# Patient Record
Sex: FEMALE | URBAN - METROPOLITAN AREA
[De-identification: names, ages, dates, MRNs, and addresses within clinical notes are randomized per-mention and may not be internally consistent; named-entity substitution may affect disease eponyms.]

---

## 2023-09-28 RX ORDER — LOSARTAN POTASSIUM AND HYDROCHLOROTHIAZIDE 12.5; 5 MG/1; MG/1
1 TABLET ORAL DAILY
COMMUNITY

## 2023-10-02 ENCOUNTER — ANESTHESIA EVENT (OUTPATIENT)
Dept: SURGERY | Facility: AMBULATORY SURGERY CENTER | Age: 58
End: 2023-10-02

## 2023-10-03 NOTE — PROGRESS NOTES
Preoperative Note     58yo with left ovarian cyst presenting for scheduled laparoscopic bilateral salpingoophorectomy    MRI: Uterus 3.6 x 4.6 x 6.1cm. ROV normal measuring 1.8 x 1.5 x 1.0cm. LOV measuring 6.8 x 5.6 x 5.3cm - well circumscribed, predominantly fat containing heterogenous, with abndlike areas of hypointensity and internal reticular ienhancement. Consistent with ovarian dermoid.     Ca125 negative    MRI demonstrates a complex left ovarian cyst morphologically consistent with a dermoid. Discussed with patient that dermoid cysts can be reliably diagnosed on imaging and are benign. However given her age, the size of the cyst, her pain, and the complexity would recommend surgical management. Reviewed surgical approach, namely laparoscopic oophorectomy. Given age, recommend pursuing BSO to avoid future pathology with the contralateral ovary. Discussed surgical risks including but not limited to pain, infection, bleeding, damage to surrounding structures including bladder/bowel/nerves; informed consent obtained. No significant changes to med/surg history, preoperative clearance obtained. Proceed to OR.     Ariadna Thomason MD

## 2023-10-04 ENCOUNTER — ANESTHESIA (OUTPATIENT)
Dept: SURGERY | Facility: AMBULATORY SURGERY CENTER | Age: 58
End: 2023-10-04

## 2023-10-04 ENCOUNTER — HOSPITAL ENCOUNTER (OUTPATIENT)
Facility: AMBULATORY SURGERY CENTER | Age: 58
Discharge: HOME | End: 2023-10-04
Attending: STUDENT IN AN ORGANIZED HEALTH CARE EDUCATION/TRAINING PROGRAM
Payer: COMMERCIAL

## 2023-10-04 VITALS
HEART RATE: 64 BPM | SYSTOLIC BLOOD PRESSURE: 121 MMHG | WEIGHT: 239 LBS | OXYGEN SATURATION: 95 % | TEMPERATURE: 97.5 F | HEIGHT: 69 IN | BODY MASS INDEX: 35.4 KG/M2 | DIASTOLIC BLOOD PRESSURE: 58 MMHG | RESPIRATION RATE: 16 BRPM

## 2023-10-04 DIAGNOSIS — N83.202 CYST OF LEFT OVARY: ICD-10-CM

## 2023-10-04 PROCEDURE — 88112 CYTOPATH CELL ENHANCE TECH: CPT | Mod: 26 | Performed by: PATHOLOGY

## 2023-10-04 RX ORDER — OXYCODONE HYDROCHLORIDE 5 MG/1
5 TABLET ORAL
Status: COMPLETED | OUTPATIENT
Start: 2023-10-04 | End: 2023-10-04

## 2023-10-04 RX ORDER — FENTANYL CITRATE 0.05 MG/ML
25 INJECTION, SOLUTION INTRAMUSCULAR; INTRAVENOUS EVERY 5 MIN PRN
Status: DISCONTINUED | OUTPATIENT
Start: 2023-10-04 | End: 2023-10-05 | Stop reason: HOSPADM

## 2023-10-04 RX ORDER — LIDOCAINE 40 MG/G
CREAM TOPICAL
Status: DISCONTINUED | OUTPATIENT
Start: 2023-10-04 | End: 2023-10-05 | Stop reason: HOSPADM

## 2023-10-04 RX ORDER — ACETAMINOPHEN 325 MG/1
975 TABLET ORAL EVERY 6 HOURS PRN
Qty: 50 TABLET | Refills: 0 | Status: SHIPPED | OUTPATIENT
Start: 2023-10-04

## 2023-10-04 RX ORDER — ONDANSETRON 2 MG/ML
INJECTION INTRAMUSCULAR; INTRAVENOUS PRN
Status: DISCONTINUED | OUTPATIENT
Start: 2023-10-04 | End: 2023-10-04

## 2023-10-04 RX ORDER — ONDANSETRON 4 MG/1
4 TABLET, ORALLY DISINTEGRATING ORAL EVERY 30 MIN PRN
Status: DISCONTINUED | OUTPATIENT
Start: 2023-10-04 | End: 2023-10-05 | Stop reason: HOSPADM

## 2023-10-04 RX ORDER — ONDANSETRON 2 MG/ML
4 INJECTION INTRAMUSCULAR; INTRAVENOUS EVERY 30 MIN PRN
Status: DISCONTINUED | OUTPATIENT
Start: 2023-10-04 | End: 2023-10-05 | Stop reason: HOSPADM

## 2023-10-04 RX ORDER — OXYCODONE HYDROCHLORIDE 5 MG/1
5 TABLET ORAL
Status: DISCONTINUED | OUTPATIENT
Start: 2023-10-04 | End: 2023-10-05 | Stop reason: HOSPADM

## 2023-10-04 RX ORDER — OXYCODONE HYDROCHLORIDE 5 MG/1
5-10 TABLET ORAL EVERY 4 HOURS PRN
Qty: 6 TABLET | Refills: 0 | Status: SHIPPED | OUTPATIENT
Start: 2023-10-04

## 2023-10-04 RX ORDER — LIDOCAINE HYDROCHLORIDE 20 MG/ML
INJECTION, SOLUTION INFILTRATION; PERINEURAL PRN
Status: DISCONTINUED | OUTPATIENT
Start: 2023-10-04 | End: 2023-10-04

## 2023-10-04 RX ORDER — FENTANYL CITRATE 50 UG/ML
INJECTION, SOLUTION INTRAMUSCULAR; INTRAVENOUS PRN
Status: DISCONTINUED | OUTPATIENT
Start: 2023-10-04 | End: 2023-10-04

## 2023-10-04 RX ORDER — HYDROMORPHONE HCL IN WATER/PF 6 MG/30 ML
0.4 PATIENT CONTROLLED ANALGESIA SYRINGE INTRAVENOUS EVERY 5 MIN PRN
Status: DISCONTINUED | OUTPATIENT
Start: 2023-10-04 | End: 2023-10-05 | Stop reason: HOSPADM

## 2023-10-04 RX ORDER — GLYCOPYRROLATE 0.2 MG/ML
INJECTION, SOLUTION INTRAMUSCULAR; INTRAVENOUS PRN
Status: DISCONTINUED | OUTPATIENT
Start: 2023-10-04 | End: 2023-10-04

## 2023-10-04 RX ORDER — SODIUM CHLORIDE, SODIUM LACTATE, POTASSIUM CHLORIDE, CALCIUM CHLORIDE 600; 310; 30; 20 MG/100ML; MG/100ML; MG/100ML; MG/100ML
INJECTION, SOLUTION INTRAVENOUS CONTINUOUS
Status: DISCONTINUED | OUTPATIENT
Start: 2023-10-04 | End: 2023-10-05 | Stop reason: HOSPADM

## 2023-10-04 RX ORDER — OXYCODONE HYDROCHLORIDE 10 MG/1
10 TABLET ORAL
Status: DISCONTINUED | OUTPATIENT
Start: 2023-10-04 | End: 2023-10-05 | Stop reason: HOSPADM

## 2023-10-04 RX ORDER — CEFAZOLIN SODIUM 2 G/100ML
2 INJECTION, SOLUTION INTRAVENOUS
Status: COMPLETED | OUTPATIENT
Start: 2023-10-04 | End: 2023-10-04

## 2023-10-04 RX ORDER — PROPOFOL 10 MG/ML
INJECTION, EMULSION INTRAVENOUS PRN
Status: DISCONTINUED | OUTPATIENT
Start: 2023-10-04 | End: 2023-10-04

## 2023-10-04 RX ORDER — PROPOFOL 10 MG/ML
INJECTION, EMULSION INTRAVENOUS CONTINUOUS PRN
Status: DISCONTINUED | OUTPATIENT
Start: 2023-10-04 | End: 2023-10-04

## 2023-10-04 RX ORDER — BUPIVACAINE HYDROCHLORIDE 2.5 MG/ML
INJECTION, SOLUTION INFILTRATION; PERINEURAL PRN
Status: DISCONTINUED | OUTPATIENT
Start: 2023-10-04 | End: 2023-10-04 | Stop reason: HOSPADM

## 2023-10-04 RX ORDER — HYDROMORPHONE HCL IN WATER/PF 6 MG/30 ML
0.2 PATIENT CONTROLLED ANALGESIA SYRINGE INTRAVENOUS EVERY 5 MIN PRN
Status: DISCONTINUED | OUTPATIENT
Start: 2023-10-04 | End: 2023-10-05 | Stop reason: HOSPADM

## 2023-10-04 RX ORDER — FENTANYL CITRATE 0.05 MG/ML
50 INJECTION, SOLUTION INTRAMUSCULAR; INTRAVENOUS EVERY 5 MIN PRN
Status: DISCONTINUED | OUTPATIENT
Start: 2023-10-04 | End: 2023-10-05 | Stop reason: HOSPADM

## 2023-10-04 RX ORDER — NEOSTIGMINE METHYLSULFATE 1 MG/ML
VIAL (ML) INJECTION PRN
Status: DISCONTINUED | OUTPATIENT
Start: 2023-10-04 | End: 2023-10-04

## 2023-10-04 RX ORDER — IBUPROFEN 800 MG/1
800 TABLET, FILM COATED ORAL EVERY 6 HOURS PRN
Qty: 30 TABLET | Refills: 0 | Status: SHIPPED | OUTPATIENT
Start: 2023-10-04

## 2023-10-04 RX ORDER — KETOROLAC TROMETHAMINE 30 MG/ML
INJECTION, SOLUTION INTRAMUSCULAR; INTRAVENOUS PRN
Status: DISCONTINUED | OUTPATIENT
Start: 2023-10-04 | End: 2023-10-04

## 2023-10-04 RX ORDER — ACETAMINOPHEN 325 MG/1
975 TABLET ORAL ONCE
Status: COMPLETED | OUTPATIENT
Start: 2023-10-04 | End: 2023-10-04

## 2023-10-04 RX ORDER — ACETAMINOPHEN 325 MG/1
975 TABLET ORAL ONCE
Status: DISCONTINUED | OUTPATIENT
Start: 2023-10-04 | End: 2023-10-05 | Stop reason: HOSPADM

## 2023-10-04 RX ORDER — IBUPROFEN 800 MG/1
800 TABLET, FILM COATED ORAL ONCE
Status: DISCONTINUED | OUTPATIENT
Start: 2023-10-04 | End: 2023-10-05 | Stop reason: HOSPADM

## 2023-10-04 RX ORDER — DEXAMETHASONE SODIUM PHOSPHATE 4 MG/ML
INJECTION, SOLUTION INTRA-ARTICULAR; INTRALESIONAL; INTRAMUSCULAR; INTRAVENOUS; SOFT TISSUE PRN
Status: DISCONTINUED | OUTPATIENT
Start: 2023-10-04 | End: 2023-10-04

## 2023-10-04 RX ORDER — CEFAZOLIN SODIUM 2 G/100ML
2 INJECTION, SOLUTION INTRAVENOUS SEE ADMIN INSTRUCTIONS
Status: DISCONTINUED | OUTPATIENT
Start: 2023-10-04 | End: 2023-10-05 | Stop reason: HOSPADM

## 2023-10-04 RX ADMIN — ONDANSETRON 4 MG: 2 INJECTION INTRAMUSCULAR; INTRAVENOUS at 14:04

## 2023-10-04 RX ADMIN — OXYCODONE HYDROCHLORIDE 5 MG: 5 TABLET ORAL at 13:57

## 2023-10-04 RX ADMIN — PROPOFOL 150 MG: 10 INJECTION, EMULSION INTRAVENOUS at 11:09

## 2023-10-04 RX ADMIN — GLYCOPYRROLATE 0.8 MG: 0.2 INJECTION, SOLUTION INTRAMUSCULAR; INTRAVENOUS at 12:08

## 2023-10-04 RX ADMIN — SODIUM CHLORIDE, SODIUM LACTATE, POTASSIUM CHLORIDE, CALCIUM CHLORIDE: 600; 310; 30; 20 INJECTION, SOLUTION INTRAVENOUS at 09:56

## 2023-10-04 RX ADMIN — KETOROLAC TROMETHAMINE 15 MG: 30 INJECTION, SOLUTION INTRAMUSCULAR; INTRAVENOUS at 12:12

## 2023-10-04 RX ADMIN — FENTANYL CITRATE 50 MCG: 50 INJECTION, SOLUTION INTRAMUSCULAR; INTRAVENOUS at 12:21

## 2023-10-04 RX ADMIN — SODIUM CHLORIDE, SODIUM LACTATE, POTASSIUM CHLORIDE, CALCIUM CHLORIDE: 600; 310; 30; 20 INJECTION, SOLUTION INTRAVENOUS at 11:49

## 2023-10-04 RX ADMIN — Medication 0.2 MG: at 13:01

## 2023-10-04 RX ADMIN — DEXAMETHASONE SODIUM PHOSPHATE 10 MG: 4 INJECTION, SOLUTION INTRA-ARTICULAR; INTRALESIONAL; INTRAMUSCULAR; INTRAVENOUS; SOFT TISSUE at 11:09

## 2023-10-04 RX ADMIN — FENTANYL CITRATE 50 MCG: 50 INJECTION, SOLUTION INTRAMUSCULAR; INTRAVENOUS at 11:27

## 2023-10-04 RX ADMIN — FENTANYL CITRATE 50 MCG: 50 INJECTION, SOLUTION INTRAMUSCULAR; INTRAVENOUS at 12:26

## 2023-10-04 RX ADMIN — LIDOCAINE HYDROCHLORIDE 3 ML: 20 INJECTION, SOLUTION INFILTRATION; PERINEURAL at 11:09

## 2023-10-04 RX ADMIN — ONDANSETRON 4 MG: 2 INJECTION INTRAMUSCULAR; INTRAVENOUS at 11:09

## 2023-10-04 RX ADMIN — Medication 5 MG: at 12:08

## 2023-10-04 RX ADMIN — FENTANYL CITRATE 50 MCG: 50 INJECTION, SOLUTION INTRAMUSCULAR; INTRAVENOUS at 11:09

## 2023-10-04 RX ADMIN — CEFAZOLIN SODIUM 2 G: 2 INJECTION, SOLUTION INTRAVENOUS at 11:02

## 2023-10-04 RX ADMIN — Medication 0.2 MG: at 12:42

## 2023-10-04 RX ADMIN — Medication 30 MG: at 11:09

## 2023-10-04 RX ADMIN — ACETAMINOPHEN 975 MG: 325 TABLET ORAL at 09:48

## 2023-10-04 RX ADMIN — PROPOFOL 180 MCG/KG/MIN: 10 INJECTION, EMULSION INTRAVENOUS at 11:09

## 2023-10-04 RX ADMIN — Medication 0.2 MG: at 12:53

## 2023-10-04 NOTE — DISCHARGE INSTRUCTIONS
You have received 975 mg of Acetaminophen (Tylenol) at 9:50AM. Please do not take an additional dose of Tylenol until after 3:50PM     Do not exceed 4,000 mg of acetaminophen during a 24 hour period and keep in mind that acetaminophen can also be found in many over-the-counter cold medications as well as narcotics that may be given for pain.     You received a dose of IV Toradol at 12:12 PM. Please do not take any additional Ibuprofen/NSAID products (Aleve/Advil/Motrin/Naproxen/Celebrex) until after 6:12 PM.    If you have any questions or concerns regarding your procedure, please contact Dr. Thomason, her office number is [phone].    Incisions: bandages can be removed tomorrow. Can shower tomorrow, allow soap/water to run over incisions but no scrubbing over incisions.   Pain medications: take tylenol and ibuprofen as scheduled, every 6 hours, alternating (so one every 3 hours) for the first 48 hours. After that can start taking them as needed. For severe pain, can take oxycodone.   Restrictions: No heavy lifting >15lbs for 2 weeks. Resume diet as tolerated.     Call the office or present to the ED if you experience heavy vaginal bleeding (soaking a pad per hour for 2 hours), worsening abdominal pain, inability to void, unremitting nausea/vomiting, or fevers.      [facility]   Adult Discharge Orders & Instructions     For 24 hours after surgery    Get plenty of rest.  A responsible adult must stay with you for at least 24 hours after you leave the hospital.   Do not drive or use heavy equipment.  If you have weakness or tingling, don't drive or use heavy equipment until this feeling goes away.  Do not drink alcohol.  Avoid strenuous or risky activities.  Ask for help when climbing stairs.   You may feel lightheaded.  IF so, sit for a few minutes before standing.  Have someone help you get up.   If you have nausea (feel sick to your stomach): Drink only clear liquids such as apple juice,  ginger ale, broth or 7-Up.  Rest may also help.  Be sure to drink enough fluids.  Move to a regular diet as you feel able.  You may have a slight fever. Call the doctor if your fever is over 100 F (37.7 C) (taken under the tongue) or lasts longer than 24 hours.  You may have a dry mouth, a sore throat, muscle aches or trouble sleeping.  These should go away after 24 hours.  Do not make important or legal decisions.   Call your doctor for any of the followin.  Signs of infection (fever, growing tenderness at the surgery site, a large amount of drainage or bleeding, severe pain, foul-smelling drainage, redness, swelling).    2. It has been over 8 to 10 hours since surgery and you are still not able to urinate (pass water).    3.  Headache for over 24 hours.

## 2023-10-04 NOTE — OP NOTE
OPERATIVE REPORT     Name: Alea Paz    MRN: [id number]    CSN: [id number]    Procedure date: 10/4/2023    PRE-OP DIAGNOSIS: left ovarian cyst    POST-OPERATIVE DIAGNOSIS: Same    OPERATION: laparoscopic bilateral salpingoophorectomy, pelvic washings     ATTENDING SURGEON: Ariadna Thomason MD    ASSISTANT(S): Circulator: Halima Mathur RN  Relief Circulator: Marifer Pires RN  Relief Scrub: Maile Horton  Scrub Person: Katie Hernandez    ANESTHESIA: General ET    INTRAVENOUS FLUIDS: see anesthesia record    URINE OUTPUT: 100 ML.    ESTIMATED BLOOD LOSS: 20 ML.    SPECIMENS: left and right ovary an fallopian tube     FINDINGS:   LSC - normal uterus, normal right adnexa, left ovary with 6cm cyst grossly consistent with dermoid, normal left fallopian tube, no adhesive disease or endometriosis    DESCRIPTION OF PROCEDURE:   The patient was informed of the risks, benefits, and alternatives of the above-mentioned procedures and the consent was signed and witnessed. The patient was taken to the operating room and placed in the supine position. General endotracheal anesthesia was achieved without difficulty. The patient was given preoperative prophylactic intravenous antibiotics. The patient was then placed in the dorsal lithotomy position using Clifford stirrups. All pressure points were avoided or padded appropriately and a Leyla Hugger  was placed. Both arms were tucked in anatomical position at the patient s side. The patient was then examined, prepped and draped in the usual sterile fashion. Exam under anesthesia as noted above. A frederick catheter was introduced into the bladder. A speculum was placed into the vagina and the anterior lip of the cervix grasped with a single tooth tenaculum. The uterus was sounded to 7 cm. A humi uterine manipulator was introduced into the uterus. The tenaculum was removed and the puncture sites hemostatic. The speculum was removed from the vagina.    On the abdomen, the Veress needle  was carefully introduced into the peritoneal cavity via a small incision at the umbilicus while tenting the abdominal wall. Placement was confirmed with a drop in intra-abdominal pressure with insufflation of CO2 gas. Pneumoperitoneum was insufflated without difficulty. The umbilical port was placed using an 5cm optical trocar. The laparoscope was inserted, and the organs immediately below the site of entry was inspected and intact. The patient was placed into trendelenburg and the remaining ports, including one 10mm left lower quadrant port and one 5mm ride sided port, were placed under direct laparoscopic guidance. The pelvis and upper abdomen were explored with findings as noted above.    Pelvic washings were performed and the specimen was collected for cytology. The right pelvic sidewall was opened with Ligasure and the peritoneal incision was extended cephalad and parallel to the IP ligament on this side.  The pelvic sidewall was opened and the pelvic vasculature was identified.  The ureter was noted to be coursing in the medial aspect of this dissection.  A window was made underneath the IP ligament on this site with care taken to protect the ureter.  The IP ligament was then cauterized and transected using bipolar forceps and monopolar rebel.  An endocatch bag was introduced into the abdomen and the specimen was placed into the bag and removed from the abdomen. Attention was then turned to the left ovary and tube where the same procedure was performed. The left ovary and tube was externalized and drained within the containment bag in order to remove the specimen from the body.      All pedicles were inspected for hemostasis, which was assured.  The fascia of the left lower quadrant 10mm port site was closed using 0 vicryl and an endofascial closure device. All ports were removed and the abdomen the gas was released. Skin incisions were closed with 4-0 Monocryl and 20 cc of 0.5% Marcaine was infiltrated.  Patient was taken to the recovery room in a stable condition.    Ariadna Thomason MD

## 2023-10-04 NOTE — ANESTHESIA CARE TRANSFER NOTE
Patient: Alea Paz    Procedure: Procedure(s):  LAPAROSCOPIC BILATERAL SALPINGO OOPHORECTOMY       Diagnosis: Cyst of left ovary [N83.202]  Diagnosis Additional Information: No value filed.    Anesthesia Type:   General     Note:    Oropharynx: oropharynx clear of all foreign objects and spontaneously breathing  Level of Consciousness: drowsy  Oxygen Supplementation: face mask  Level of Supplemental Oxygen (L/min / FiO2): 6  Independent Airway: airway patency satisfactory and stable  Dentition: dentition unchanged  Vital Signs Stable: post-procedure vital signs reviewed and stable  Report to RN Given: handoff report given  Patient transferred to: PACU    Handoff Report: Identifed the Patient, Identified the Reponsible Provider, Reviewed the pertinent medical history, Discussed the surgical course, Reviewed Intra-OP anesthesia mangement and issues during anesthesia, Set expectations for post-procedure period and Allowed opportunity for questions and acknowledgement of understanding      Vitals:  Vitals Value Taken Time   /69 10/04/23 1225   Temp 97.5  F (36.4  C) 10/04/23 1225   Pulse 75 10/04/23 1225   Resp 16 10/04/23 1225   SpO2 98 % 10/04/23 1225       Electronically Signed By: TIFFANI Rodriguez CRNA  October 4, 2023  12:28 PM

## 2023-10-04 NOTE — ANESTHESIA POSTPROCEDURE EVALUATION
Patient: Alea Paz    Procedure: Procedure(s):  LAPAROSCOPIC BILATERAL SALPINGO OOPHORECTOMY       Anesthesia Type:  General    Note:  Disposition: Outpatient   Postop Pain Control: Uneventful            Sign Out: Well controlled pain   PONV: No   Neuro/Psych: Uneventful            Sign Out: Acceptable/Baseline neuro status   Airway/Respiratory: Uneventful            Sign Out: Acceptable/Baseline resp. status   CV/Hemodynamics: Uneventful            Sign Out: Acceptable CV status; No obvious hypovolemia; No obvious fluid overload   Other NRE: NONE   DID A NON-ROUTINE EVENT OCCUR? No           Last vitals:  Vitals Value Taken Time   /67 10/04/23 1245   Temp 97.5  F (36.4  C) 10/04/23 1225   Pulse 56 10/04/23 1257   Resp 16 10/04/23 1253   SpO2 98 % 10/04/23 1257   Vitals shown include unvalidated device data.    Electronically Signed By: Rahel Underwood MD  October 4, 2023  1:01 PM

## 2023-10-04 NOTE — ANESTHESIA PROCEDURE NOTES
Airway       Patient location during procedure: OR       Procedure Start/Stop Times: 10/4/2023 11:13 AM  Staff -        Anesthesiologist:  Rahel Underwood MD       CRNA: Katie Butterfield APRN CRNA       Performed By: CRNAIndications and Patient Condition       Indications for airway management: kiara-procedural       Induction type:intravenous       Mask difficulty assessment: 1 - vent by mask    Final Airway Details       Final airway type: endotracheal airway       Successful airway: ETT - single and Oral  Endotracheal Airway Details        ETT size (mm): 7.0       Cuffed: yes       Successful intubation technique: direct laryngoscopy       DL Blade Type: Og 2       Grade View of Cords: 1       Adjucts: stylet       Measured from: gums/teeth       Secured at (cm): 21       Bite block used: None    Post intubation assessment        Placement verified by: capnometry, equal breath sounds and chest rise        Number of attempts at approach: 1       Secured with: silk tape       Ease of procedure: easy       Dentition: Intact and Unchanged    Medication(s) Administered   Medication Administration Time: 10/4/2023 11:13 AM

## 2023-10-06 LAB
PATH REPORT.COMMENTS IMP SPEC: NORMAL
PATH REPORT.FINAL DX SPEC: NORMAL
PATH REPORT.GROSS SPEC: NORMAL
PATH REPORT.MICROSCOPIC SPEC OTHER STN: NORMAL
PATH REPORT.RELEVANT HX SPEC: NORMAL